# Patient Record
Sex: FEMALE | Race: BLACK OR AFRICAN AMERICAN | NOT HISPANIC OR LATINO | URBAN - METROPOLITAN AREA
[De-identification: names, ages, dates, MRNs, and addresses within clinical notes are randomized per-mention and may not be internally consistent; named-entity substitution may affect disease eponyms.]

---

## 2017-11-16 ENCOUNTER — EMERGENCY (EMERGENCY)
Facility: HOSPITAL | Age: 62
LOS: 0 days | Discharge: HOME | End: 2017-11-16
Attending: STUDENT IN AN ORGANIZED HEALTH CARE EDUCATION/TRAINING PROGRAM
Payer: COMMERCIAL

## 2017-11-16 VITALS
OXYGEN SATURATION: 96 % | TEMPERATURE: 98 F | WEIGHT: 149.03 LBS | HEIGHT: 64 IN | HEART RATE: 78 BPM | SYSTOLIC BLOOD PRESSURE: 134 MMHG | RESPIRATION RATE: 20 BRPM | DIASTOLIC BLOOD PRESSURE: 84 MMHG

## 2017-11-16 PROCEDURE — 99284 EMERGENCY DEPT VISIT MOD MDM: CPT
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
## 2017-11-17 DIAGNOSIS — Z04.8 ENCOUNTER FOR EXAMINATION AND OBSERVATION FOR OTHER SPECIFIED REASONS: ICD-10-CM

## 2017-11-17 DIAGNOSIS — Z04.9 ENCOUNTER FOR EXAMINATION AND OBSERVATION FOR UNSPECIFIED REASON: ICD-10-CM
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
## 2017-11-17 DIAGNOSIS — Z04.71 ENCOUNTER FOR EXAMINATION AND OBSERVATION FOLLOWING ALLEGED ADULT PHYSICAL ABUSE: ICD-10-CM

## 2022-05-26 NOTE — ED ADULT NURSE NOTE - [id number]_A_ED
Writer called and could not leave message for patient to schedule next lab and virtual visit with PA.    No

## 2022-06-09 ENCOUNTER — APPOINTMENT (OUTPATIENT)
Dept: ORTHOPEDIC SURGERY | Facility: CLINIC | Age: 67
End: 2022-06-09
Payer: COMMERCIAL

## 2022-06-09 VITALS — HEIGHT: 60 IN | BODY MASS INDEX: 29.84 KG/M2 | WEIGHT: 152 LBS

## 2022-06-09 DIAGNOSIS — Z78.9 OTHER SPECIFIED HEALTH STATUS: ICD-10-CM

## 2022-06-09 DIAGNOSIS — M18.11 UNILATERAL PRIMARY OSTEOARTHRITIS OF FIRST CARPOMETACARPAL JOINT, RIGHT HAND: ICD-10-CM

## 2022-06-09 DIAGNOSIS — M18.12 UNILATERAL PRIMARY OSTEOARTHRITIS OF FIRST CARPOMETACARPAL JOINT, LEFT HAND: ICD-10-CM

## 2022-06-09 PROCEDURE — 99214 OFFICE O/P EST MOD 30 MIN: CPT | Mod: 25

## 2022-06-09 PROCEDURE — 20605 DRAIN/INJ JOINT/BURSA W/O US: CPT

## 2022-06-09 PROCEDURE — 73130 X-RAY EXAM OF HAND: CPT | Mod: 50

## 2022-06-09 NOTE — ASSESSMENT
[FreeTextEntry1] : After a discussion of the risks, benefits and alternatives along with the expectations, the patient was amenable to injection.  The indication for injection is pain, inflammation and radiographically confirmed arthritis.  The skin overlying the carpometacarpal joint was prepared with alcohol and ethyl chloride was sprayed topically.  Sterile technique was used. An injection of 1ml of lidocaine and 6mg of betamethasone was used.  The patient was instructed to call if redness, pain or fever occur.  They may apply ice for 15 minutes eery hour for the next 12-24 hours as tolerated.  .  The patient understands that it may take 2-5 days to see a noticeable difference.  Sterile Band-Aid was applied.\par CSI was given in left hand.

## 2022-06-09 NOTE — IMAGING
[Bilateral] : hand bilaterally [Degenerative change] : Degenerative change [de-identified] : left thumb base with shoulder deformity at the cmc.  TTP and positive basal grind at the cmc.\par median/ulnar/radial serve sensation intact\par ain/pin/ulnar motor intact\par palpable pulsres\par CR<2s\par full active range of motion otherwise\par \par right thumb base with shoulder deformity at the cmc.  TTP and positive basal grind at the cmc.\par median/ulnar/radial serve sensation intact\par ain/pin/ulnar motor intact\par palpable pulsres\par CR<2s\par full active range of motion otherwise\par \par \par  [FreeTextEntry1] : 1st [facility] arthritis

## 2022-06-09 NOTE — HISTORY OF PRESENT ILLNESS
[5] : 5 [2] : 2 [Dull/Aching] : dull/aching [Rest] : rest [de-identified] : 6/9/22:  Pt had carpal tunnel release years ago.  It is painful at the base of the thumb.  It helps when she take Tylenol.\par \par GENESIS, works at a [employer] [FreeTextEntry5] : pt had sx in the left hand for carpal tunnel. pt feels discomfort in the in the right hand. pt feels numbness in bill hands. pt feels  shooting pain in bill hands  [de-identified] : motion

## 2022-06-22 DIAGNOSIS — M79.605 PAIN IN LEFT LEG: ICD-10-CM

## 2022-06-23 ENCOUNTER — APPOINTMENT (OUTPATIENT)
Dept: ORTHOPEDIC SURGERY | Facility: CLINIC | Age: 67
End: 2022-06-23

## 2024-05-18 ENCOUNTER — OFFICE (OUTPATIENT)
Dept: URBAN - METROPOLITAN AREA CLINIC 34 | Facility: CLINIC | Age: 69
Setting detail: OPHTHALMOLOGY
End: 2024-05-18
Payer: COMMERCIAL

## 2024-05-18 DIAGNOSIS — H43.813: ICD-10-CM

## 2024-05-18 DIAGNOSIS — H25.13: ICD-10-CM

## 2024-05-18 PROCEDURE — 92004 COMPRE OPH EXAM NEW PT 1/>: CPT | Performed by: OPHTHALMOLOGY

## 2024-05-18 ASSESSMENT — LID EXAM ASSESSMENTS
OS_COMMENTS: MAKEUP
OD_COMMENTS: MAKEUP

## 2024-05-18 ASSESSMENT — CONFRONTATIONAL VISUAL FIELD TEST (CVF)
OD_FINDINGS: FULL
OS_FINDINGS: FULL

## 2024-12-05 ENCOUNTER — DOCTOR'S OFFICE (OUTPATIENT)
Facility: LOCATION | Age: 69
Setting detail: OPHTHALMOLOGY
End: 2024-12-05
Payer: COMMERCIAL

## 2024-12-05 DIAGNOSIS — H16.223: ICD-10-CM

## 2024-12-05 DIAGNOSIS — H21.232: ICD-10-CM

## 2024-12-05 DIAGNOSIS — H25.13: ICD-10-CM

## 2024-12-05 PROCEDURE — 99213 OFFICE O/P EST LOW 20 MIN: CPT | Performed by: OPHTHALMOLOGY

## 2024-12-05 ASSESSMENT — KERATOMETRY
OS_K1POWER_DIOPTERS: 43.75
OS_K2POWER_DIOPTERS: 44.50
OS_AXISANGLE_DEGREES: 077
OD_AXISANGLE_DEGREES: 090
OD_K1POWER_DIOPTERS: 43.25
OD_K2POWER_DIOPTERS: 44.00

## 2024-12-05 ASSESSMENT — REFRACTION_CURRENTRX
OD_VPRISM_DIRECTION: SV
OS_SPHERE: +1.25
OD_SPHERE: +1.25
OD_OVR_VA: 20/
OS_OVR_VA: 20/
OS_VPRISM_DIRECTION: SV

## 2024-12-05 ASSESSMENT — REFRACTION_AUTOREFRACTION
OS_CYLINDER: +1.00
OS_AXIS: 008
OS_SPHERE: 0.00
OD_AXIS: 175
OD_SPHERE: -0.75
OD_CYLINDER: +1.00

## 2024-12-05 ASSESSMENT — SUPERFICIAL PUNCTATE KERATITIS (SPK)
OD_SPK: 1+ 2+
OS_SPK: T

## 2024-12-05 ASSESSMENT — CONFRONTATIONAL VISUAL FIELD TEST (CVF)
OS_FINDINGS: FULL
OD_FINDINGS: FULL

## 2024-12-05 ASSESSMENT — DRY EYES - PHYSICIAN NOTES
OD_GENERALCOMMENTS: SPK INF
OS_GENERALCOMMENTS: SPK INF

## 2024-12-05 ASSESSMENT — VISUAL ACUITY
OS_BCVA: 20/20+2
OD_BCVA: 20/20-1

## 2024-12-05 ASSESSMENT — TONOMETRY
OD_IOP_MMHG: 16
OS_IOP_MMHG: 12

## 2025-05-13 ENCOUNTER — RX ONLY (RX ONLY)
Age: 70
End: 2025-05-13

## 2025-05-13 ENCOUNTER — DOCTOR'S OFFICE (OUTPATIENT)
Facility: LOCATION | Age: 70
Setting detail: OPHTHALMOLOGY
End: 2025-05-13
Payer: COMMERCIAL

## 2025-05-13 DIAGNOSIS — H21.232: ICD-10-CM

## 2025-05-13 DIAGNOSIS — H16.223: ICD-10-CM

## 2025-05-13 DIAGNOSIS — H25.13: ICD-10-CM

## 2025-05-13 DIAGNOSIS — H43.813: ICD-10-CM

## 2025-05-13 PROBLEM — H52.7 REFRACTIVE ERROR: Status: ACTIVE | Noted: 2025-05-13

## 2025-05-13 PROCEDURE — 99214 OFFICE O/P EST MOD 30 MIN: CPT | Performed by: OPHTHALMOLOGY

## 2025-05-13 ASSESSMENT — REFRACTION_MANIFEST
OS_AXIS: 050
OD_AXIS: 147
OS_CYLINDER: +1.25
OS_SPHERE: -0.75
OD_SPHERE: -0.75
OD_CYLINDER: +0.75

## 2025-05-13 ASSESSMENT — DRY EYES - PHYSICIAN NOTES
OS_GENERALCOMMENTS: SPK INF
OD_GENERALCOMMENTS: SPK INF

## 2025-05-13 ASSESSMENT — REFRACTION_CURRENTRX
OS_VPRISM_DIRECTION: SV
OS_OVR_VA: 20/
OD_VPRISM_DIRECTION: SV
OD_SPHERE: +1.25
OS_SPHERE: +1.25
OD_OVR_VA: 20/

## 2025-05-13 ASSESSMENT — REFRACTION_AUTOREFRACTION
OD_AXIS: 147
OD_SPHERE: -0.75
OS_SPHERE: -0.75
OS_AXIS: 050
OD_CYLINDER: +0.75
OS_CYLINDER: +1.25

## 2025-05-13 ASSESSMENT — CONFRONTATIONAL VISUAL FIELD TEST (CVF)
OS_FINDINGS: FULL
OD_FINDINGS: FULL

## 2025-05-13 ASSESSMENT — SUPERFICIAL PUNCTATE KERATITIS (SPK)
OS_SPK: T
OD_SPK: 1+ 2+

## 2025-05-13 ASSESSMENT — KERATOMETRY
OS_K2POWER_DIOPTERS: 44.50
OS_AXISANGLE_DEGREES: 063
OS_K1POWER_DIOPTERS: 44.00
OD_AXISANGLE_DEGREES: 104
OD_K2POWER_DIOPTERS: 44.50
OD_K1POWER_DIOPTERS: 43.50

## 2025-05-13 ASSESSMENT — VISUAL ACUITY
OS_BCVA: 20/30-2
OD_BCVA: 20/25-2

## 2025-05-13 ASSESSMENT — LID EXAM ASSESSMENTS
OS_COMMENTS: MAKEUP
OD_COMMENTS: MAKEUP

## 2025-05-13 ASSESSMENT — TONOMETRY
OD_IOP_MMHG: 12
OS_IOP_MMHG: 12

## 2025-06-16 ENCOUNTER — DOCTOR'S OFFICE (OUTPATIENT)
Facility: LOCATION | Age: 70
Setting detail: OPHTHALMOLOGY
End: 2025-06-16
Payer: COMMERCIAL

## 2025-06-16 DIAGNOSIS — H25.13: ICD-10-CM

## 2025-06-16 DIAGNOSIS — H25.12: ICD-10-CM

## 2025-06-16 PROCEDURE — 99213 OFFICE O/P EST LOW 20 MIN: CPT | Performed by: OPHTHALMOLOGY

## 2025-06-16 PROCEDURE — 92136 OPHTHALMIC BIOMETRY: CPT | Performed by: OPHTHALMOLOGY

## 2025-06-16 PROCEDURE — 92136 OPHTHALMIC BIOMETRY: CPT | Mod: TC | Performed by: OPHTHALMOLOGY

## 2025-06-16 ASSESSMENT — KERATOMETRY
OS_K1POWER_DIOPTERS: 43.75
OD_K1POWER_DIOPTERS: 43.50
OD_AXISANGLE_DEGREES: 099
OS_AXISANGLE_DEGREES: 082
OD_K2POWER_DIOPTERS: 45.00
OS_K2POWER_DIOPTERS: 45.25

## 2025-06-16 ASSESSMENT — VISUAL ACUITY
OS_BCVA: 20/20 -1
OD_BCVA: 20/20 -2

## 2025-06-16 ASSESSMENT — REFRACTION_CURRENTRX
OS_SPHERE: +1.25
OS_OVR_VA: 20/
OD_OVR_VA: 20/
OD_SPHERE: +1.25
OD_VPRISM_DIRECTION: SV
OS_VPRISM_DIRECTION: SV

## 2025-06-16 ASSESSMENT — CONFRONTATIONAL VISUAL FIELD TEST (CVF)
OS_FINDINGS: FULL
OD_FINDINGS: FULL

## 2025-06-16 ASSESSMENT — REFRACTION_AUTOREFRACTION
OD_CYLINDER: +1.00
OS_CYLINDER: +0.50
OS_SPHERE: -0.25
OD_SPHERE: -0.50
OS_AXIS: 020
OD_AXIS: 169

## 2025-06-16 ASSESSMENT — REFRACTION_MANIFEST
OD_AXIS: 147
OD_SPHERE: -0.75
OD_CYLINDER: +0.75
OS_AXIS: 050
OS_CYLINDER: +1.25
OS_SPHERE: -0.75

## 2025-06-16 ASSESSMENT — DRY EYES - PHYSICIAN NOTES
OD_GENERALCOMMENTS: SPK INF
OS_GENERALCOMMENTS: SPK INF

## 2025-06-16 ASSESSMENT — SUPERFICIAL PUNCTATE KERATITIS (SPK)
OS_SPK: T
OD_SPK: 1+ 2+

## 2025-06-27 ENCOUNTER — RX ONLY (RX ONLY)
Age: 70
End: 2025-06-27

## 2025-06-27 ENCOUNTER — DOCTOR'S OFFICE (OUTPATIENT)
Facility: LOCATION | Age: 70
Setting detail: OPHTHALMOLOGY
End: 2025-06-27
Payer: COMMERCIAL

## 2025-06-27 DIAGNOSIS — Z96.1: ICD-10-CM

## 2025-06-27 PROBLEM — H25.11 CATARACT SENILE NUCLEAR SCLEROSIS; RIGHT EYE: Status: ACTIVE | Noted: 2025-06-27

## 2025-06-27 PROCEDURE — 99024 POSTOP FOLLOW-UP VISIT: CPT | Performed by: OPHTHALMOLOGY

## 2025-06-27 ASSESSMENT — KERATOMETRY
OD_AXISANGLE_DEGREES: 095
OS_AXISANGLE_DEGREES: 084
OS_K2POWER_DIOPTERS: 45.00
OS_K1POWER_DIOPTERS: 43.75
OD_K2POWER_DIOPTERS: 44.75
OD_K1POWER_DIOPTERS: 43.50

## 2025-06-27 ASSESSMENT — SUPERFICIAL PUNCTATE KERATITIS (SPK)
OD_SPK: 1+ 2+
OS_SPK: ABSENT

## 2025-06-27 ASSESSMENT — TONOMETRY: OS_IOP_MMHG: 16

## 2025-06-27 ASSESSMENT — DRY EYES - PHYSICIAN NOTES: OD_GENERALCOMMENTS: SPK INF

## 2025-06-27 ASSESSMENT — REFRACTION_CURRENTRX
OD_SPHERE: +1.25
OD_VPRISM_DIRECTION: SV
OD_OVR_VA: 20/
OS_SPHERE: +1.25
OS_OVR_VA: 20/
OS_VPRISM_DIRECTION: SV

## 2025-06-27 ASSESSMENT — REFRACTION_AUTOREFRACTION
OD_AXIS: 140
OD_SPHERE: -1.25
OD_CYLINDER: +1.25
OS_CYLINDER: +3.00
OS_SPHERE: -2.50
OS_AXIS: 084

## 2025-06-27 ASSESSMENT — REFRACTION_MANIFEST
OD_SPHERE: -0.75
OS_SPHERE: -0.75
OD_AXIS: 147
OS_CYLINDER: +1.25
OS_AXIS: 050
OD_CYLINDER: +0.75

## 2025-06-27 ASSESSMENT — VISUAL ACUITY
OD_BCVA: 20/20-1
OS_BCVA: 20/20-1

## 2025-06-27 ASSESSMENT — CONFRONTATIONAL VISUAL FIELD TEST (CVF)
OS_FINDINGS: FULL
OD_FINDINGS: FULL

## 2025-07-15 ENCOUNTER — DOCTOR'S OFFICE (OUTPATIENT)
Facility: LOCATION | Age: 70
Setting detail: OPHTHALMOLOGY
End: 2025-07-15
Payer: COMMERCIAL

## 2025-07-15 DIAGNOSIS — Z96.1: ICD-10-CM

## 2025-07-15 DIAGNOSIS — H25.11: ICD-10-CM

## 2025-07-15 PROCEDURE — 99024 POSTOP FOLLOW-UP VISIT: CPT | Performed by: OPHTHALMOLOGY

## 2025-07-15 PROCEDURE — 92136 OPHTHALMIC BIOMETRY: CPT | Mod: 26 | Performed by: OPHTHALMOLOGY

## 2025-07-15 ASSESSMENT — REFRACTION_CURRENTRX
OD_SPHERE: +1.25
OD_VPRISM_DIRECTION: SV
OS_SPHERE: +1.25
OS_OVR_VA: 20/
OD_OVR_VA: 20/
OS_VPRISM_DIRECTION: SV

## 2025-07-15 ASSESSMENT — KERATOMETRY
OD_K1POWER_DIOPTERS: 43.50
OS_K2POWER_DIOPTERS: 44.25
OD_AXISANGLE_DEGREES: 096
OS_AXISANGLE_DEGREES: 097
OS_K1POWER_DIOPTERS: 43.50
OD_K2POWER_DIOPTERS: 4.50

## 2025-07-15 ASSESSMENT — REFRACTION_AUTOREFRACTION
OS_CYLINDER: +1.00
OD_CYLINDER: +1.00
OD_AXIS: 171
OD_SPHERE: -0.50
OS_SPHERE: -0.75
OS_AXIS: 088

## 2025-07-15 ASSESSMENT — SUPERFICIAL PUNCTATE KERATITIS (SPK): OD_SPK: 1+ 2+

## 2025-07-15 ASSESSMENT — REFRACTION_MANIFEST
OS_AXIS: 050
OS_VA1: 20/20
OS_CYLINDER: +1.25
OS_CYLINDER: +1.00
OD_SPHERE: -0.75
OD_AXIS: 147
OS_SPHERE: -0.25
OD_CYLINDER: +0.75
OS_AXIS: 088
OS_SPHERE: -0.75

## 2025-07-15 ASSESSMENT — VISUAL ACUITY
OS_BCVA: 20/25+2
OD_BCVA: 20/20-1

## 2025-07-15 ASSESSMENT — DRY EYES - PHYSICIAN NOTES: OD_GENERALCOMMENTS: SPK INF

## 2025-07-15 ASSESSMENT — TONOMETRY: OS_IOP_MMHG: 10

## 2025-07-15 ASSESSMENT — CONFRONTATIONAL VISUAL FIELD TEST (CVF)
OS_FINDINGS: FULL
OD_FINDINGS: FULL